# Patient Record
Sex: MALE | Race: BLACK OR AFRICAN AMERICAN | Employment: STUDENT | URBAN - METROPOLITAN AREA
[De-identification: names, ages, dates, MRNs, and addresses within clinical notes are randomized per-mention and may not be internally consistent; named-entity substitution may affect disease eponyms.]

---

## 2017-02-22 ENCOUNTER — OFFICE VISIT (OUTPATIENT)
Dept: FAMILY MEDICINE CLINIC | Age: 22
End: 2017-02-22

## 2017-02-22 VITALS
TEMPERATURE: 98.6 F | WEIGHT: 192 LBS | OXYGEN SATURATION: 99 % | DIASTOLIC BLOOD PRESSURE: 72 MMHG | SYSTOLIC BLOOD PRESSURE: 114 MMHG | HEART RATE: 78 BPM | RESPIRATION RATE: 16 BRPM | HEIGHT: 68 IN | BODY MASS INDEX: 29.1 KG/M2

## 2017-02-22 DIAGNOSIS — Z00.00 ROUTINE MEDICAL EXAM: Primary | ICD-10-CM

## 2017-02-22 DIAGNOSIS — R76.11 PPD POSITIVE, TREATED: ICD-10-CM

## 2017-02-22 RX ORDER — DESONIDE 0.5 MG/ML
LOTION TOPICAL 2 TIMES DAILY
Qty: 59 ML | Refills: 0 | Status: SHIPPED | OUTPATIENT
Start: 2017-02-22 | End: 2017-03-08

## 2017-02-22 NOTE — MR AVS SNAPSHOT
Visit Information Date & Time Provider Department Dept. Phone Encounter #  
 2/22/2017  3:00 PM Braydon Guo, [facility] [phone] [id number] Follow-up Instructions Return in about 1 year (around 2/22/2018), or as needed. Upcoming Health Maintenance Date Due Hepatitis A Peds Age 1-18 (1 of 2 - Standard Series) 8/18/1998 HPV AGE 9Y-26Y (1 of 3 - Male 3 Dose Series) 8/18/2008 INFLUENZA AGE 9 TO ADULT 8/1/2016 DTaP/Tdap/Td series (7 - Td) 8/4/2018 Allergies as of 2/22/2017  Review Complete On: 2/22/2017 By: Braydon Guo MD  
 No Known Allergies Current Immunizations  Reviewed on 2/22/2017 Name Date DTAP Vaccine 3/5/2002, 11/19/1998, 2/19/1998 DTAP/HIB Combined Vaccine 1997, 1997 HIB Vaccine 11/19/1998, 2/19/1998 Hepatitis B Vaccine 2/19/1998, 1997, 1997 IPV 3/5/2002, 1997, 1997 MMR Vaccine 3/5/2002, 11/19/1998 Meningococcal (MCV4P) Vaccine 7/29/2015 OPV 11/19/1998 PPD 2/29/2012 TB Skin Test (PPD) Intradermal 6/20/2016, 3/12/2013  3:48 PM  
 TDAP Vaccine 8/4/2008 Varicella Virus Vaccine Live 8/4/2008, 11/19/1998 Reviewed by Yonis Olmos on 2/22/2017 at  3:15 PM  
You Were Diagnosed With   
  
 Codes Comments Routine medical exam    -  Primary ICD-10-CM: Z00.00 ICD-9-CM: V70.0   
 PPD positive, treated     ICD-10-CM: R76.11 
ICD-9-CM: 795.51 Vitals BP  
  
  
  
  
  
 114/72 (27 %/ 36 %)* (BP 1 Location: Right arm, BP Patient Position: Sitting) *BP percentiles are based on NHBPEP's 4th Report Growth percentiles are based on CDC 2-20 Years data. Vitals History BMI and BSA Data Body Mass Index Body Surface Area  
 29.19 kg/m 2 2.04 m 2 Preferred Pharmacy Pharmacy Name Phone [facility] [address], [address] [phone] Your Updated Medication List  
  
   
 This list is accurate as of: 2/22/17  3:52 PM.  Always use your most recent med list.  
  
  
  
  
 adapalene 0.1 % topical cream  
Commonly known as:  DIFFERIN Apply  to affected area nightly. use small amount as directed  
  
 clindamycin-benzoyl peroxide 1-5 % topical gel Commonly known as:  Misael Toombs Apply  to affected area every morning. Apply to affected area after the skin has been cleansed and dried. desonide 0.05 % topical lotion Commonly known as:  Debby Rathke Apply  to affected area two (2) times a day for 14 days. Prescriptions Printed Refills  
 desonide (TRIDESILON) 0.05 % topical lotion 0 Sig: Apply  to affected area two (2) times a day for 14 days. Class: Print Route: Topical  
  
Follow-up Instructions Return in about 1 year (around 2/22/2018), or as needed. To-Do List   
 02/22/2017 Imaging:  XR CHEST PA LAT Patient Instructions Well Visit, Ages 25 to 48: Care Instructions Your Care Instructions Physical exams can help you stay healthy. Your doctor has checked your overall health and may have suggested ways to take good care of yourself. He or she also may have recommended tests. At home, you can help prevent illness with healthy eating, regular exercise, and other steps. Follow-up care is a key part of your treatment and safety. Be sure to make and go to all appointments, and call your doctor if you are having problems. It's also a good idea to know your test results and keep a list of the medicines you take. How can you care for yourself at home? · Reach and stay at a healthy weight. This will lower your risk for many problems, such as obesity, diabetes, heart disease, and high blood pressure. · Get at least 30 minutes of physical activity on most days of the week. Walking is a good choice. You also may want to do other activities, such as running, swimming, cycling, or playing tennis or team sports.  Discuss any changes in your exercise program with your doctor. · Do not smoke or allow others to smoke around you. If you need help quitting, talk to your doctor about stop-smoking programs and medicines. These can increase your chances of quitting for good. · Talk to your doctor about whether you have any risk factors for sexually transmitted infections (STIs). Having one sex partner (who does not have STIs and does not have sex with anyone else) is a good way to avoid these infections. · Use birth control if you do not want to have children at this time. Talk with your doctor about the choices available and what might be best for you. · Protect your skin from too much sun. When you're outdoors from 10 a.m. to 4 p.m., stay in the shade or cover up with clothing and a hat with a wide brim. Wear sunglasses that block UV rays. Even when it's cloudy, put broad-spectrum sunscreen (SPF 30 or higher) on any exposed skin. · See a dentist one or two times a year for checkups and to have your teeth cleaned. · Wear a seat belt in the car. · Drink alcohol in moderation, if at all. That means no more than 2 drinks a day for men and 1 drink a day for women. Follow your doctor's advice about when to have certain tests. These tests can spot problems early. For everyone · Cholesterol. Have the fat (cholesterol) in your blood tested after age 21. Your doctor will tell you how often to have this done based on your age, family history, or other things that can increase your risk for heart disease. · Blood pressure. Have your blood pressure checked during a routine doctor visit. Your doctor will tell you how often to check your blood pressure based on your age, your blood pressure results, and other factors. · Vision. Talk with your doctor about how often to have a glaucoma test. 
· Diabetes. Ask your doctor whether you should have tests for diabetes. · Colon cancer. Have a test for colon cancer at age 48.  You may have one of several tests. If you are younger than 48, you may need a test earlier if you have any risk factors. Risk factors include whether you already had a precancerous polyp removed from your colon or whether your parent, brother, sister, or child has had colon cancer. For women · Breast exam and mammogram. Talk to your doctor about when you should have a clinical breast exam and a mammogram. Medical experts differ on whether and how often women under 50 should have these tests. Your doctor can help you decide what is right for you. · Pap test and pelvic exam. Begin Pap tests at age 24. A Pap test is the best way to find cervical cancer. The test often is part of a pelvic exam. Ask how often to have this test. 
· Tests for sexually transmitted infections (STIs). Ask whether you should have tests for STIs. You may be at risk if you have sex with more than one person, especially if your partners do not wear condoms. For men · Tests for sexually transmitted infections (STIs). Ask whether you should have tests for STIs. You may be at risk if you have sex with more than one person, especially if you do not wear a condom. · Testicular cancer exam. Ask your doctor whether you should check your testicles regularly. · Prostate exam. Talk to your doctor about whether you should have a blood test (called a PSA test) for prostate cancer. Experts differ on whether and when men should have this test. Some experts suggest it if you are older than 39 and are -American or have a father or brother who got prostate cancer when he was younger than 72. When should you call for help? Watch closely for changes in your health, and be sure to contact your doctor if you have any problems or symptoms that concern you. Where can you learn more? Go to http://glenis-brianna.info/. Enter P072 in the search box to learn more about \"Well Visit, Ages 25 to 48: Care Instructions. \" Current as of: July 19, 2016 Content Version: 11.1 © 9333-8307 [employer]. Care instructions adapted under license by [employer] (which disclaims liability or warranty for this information). If you have questions about a medical condition or this instruction, always ask your healthcare professional. [address] any warranty or liability for your use of this information. Testicular Self-Exam: Care Instructions Your Care Instructions A self-exam is a way for you to check for cancer of the testicles. Although testicular cancer is rare, it is one of the most common tumors in men younger than 28. Many testicular cancers are found during self-exam. In the early stages of testicular cancer, the lump, which may be about the size of a pea, usually is not painful. Testicular cancer, especially if treated early, is very often cured. By doing this self-exam regularly, you can learn the normal size, shape, and weight of your testicles. This allows you to note any changes. Follow-up care is a key part of your treatment and safety. Be sure to make and go to all appointments, and call your doctor if you are having problems. Its also a good idea to know your test results and keep a list of the medicines you take. How can you care for yourself at home? · The exam is best done during or after a bath or showerwhen the scrotum, the skin sac that holds the testicles, is relaxed. · Stand and place your right leg on a raised surface about chair height. Then gently feel your scrotum until you find the right testicle. · Roll the testicle gently but firmly between your thumb and fingers of both hands. Carefully feel the surface for lumps. Feel for any change in the size, shape, or texture of the testicle. The testicle should feel round and smooth. It is normal for one testicle to be slightly larger than the other one. · Repeat this for the left side. Feel the entire surface of both testicles. · You may feel the epididymis, the soft tube behind each testicle. Become familiar with this structure so that you won't mistake it for a lump. When should you call for help? Call your doctor now or seek immediate medical care if: 
· You find a lump. · You notice that one of your testicles is swollen or larger than it was the last time you checked. · You notice that a testicle has become harder or firmer. · You have pain in the testicles or scrotum. · You have a feeling of heaviness in a testicle or the scrotum. · You notice a change in your testicles. Watch closely for changes in your health, and be sure to contact your doctor if: 
· You need more information about testicular self-exam. 
Where can you learn more? Go to http://glenis-brianna.info/. Enter D107 in the search box to learn more about \"Testicular Self-Exam: Care Instructions. \" Current as of: May 24, 2016 Content Version: 11.1 © 2310-4686 [employer]. Care instructions adapted under license by [employer] (which disclaims liability or warranty for this information). If you have questions about a medical condition or this instruction, always ask your healthcare professional. [place] [address] any warranty or liability for your use of this information. Introducing hospitals & HEALTH SERVICES! [facility] introduces [employer] patient portal. Now you can access parts of your medical record, email your doctor's office, and request medication refills online. 1. In your internet browser, go to [email or web address] 2. Click on the First Time User? Click Here link in the Sign In box. You will see the New Member Sign Up page. 3. Enter your [employer] Access Code exactly as it appears below. You will not need to use this code after youve completed the sign-up process. If you do not sign up before the expiration date, you must request a new code. · Sharalike Access Code: [email or web address] Expires: 5/23/2017  3:31 PM 
 
4. Enter the last four digits of your Social Security Number (xxxx) and Date of Birth (mm/dd/yyyy) as indicated and click Submit. You will be taken to the next sign-up page. 5. Create a Sharalike ID. This will be your Sharalike login ID and cannot be changed, so think of one that is secure and easy to remember. 6. Create a Shar[employer]ike password. You can change your password at any time. 7. Enter your Password Reset Question and Answer. This can be used at a later time if you forget your password. 8. Enter your e-mail address. You will receive e-mail notification when new information is available in [address] 9. Click Sign Up. You can now view and download portions of your medical record. 10. Click the Download Summary menu link to download a portable copy of your medical information. If you have questions, please visit the Frequently Asked Questions section of the Shar[employer] website. Remember, Shar[employer]ike is NOT to be used for urgent needs. For medical emergencies, dial 911. Now available from your iPhone and Android! Please provide this summary of care documentation to your next provider. Your primary care clinician is listed as June Zuniga. If you have any questions after today's visit, please call [phone].

## 2017-02-22 NOTE — PROGRESS NOTES
1. Have you been to the ER, urgent care clinic since your last visit? Hospitalized since your last visit? No    2. Have you seen or consulted any other health care providers outside of the [address] [place] [address] since your last visit? Include any pap smears or colon screening. Yes When: 02- Where: Dr. Jessica Aaron.  [facility] (Derm) Reason for visit: rash to facial area

## 2017-02-22 NOTE — PATIENT INSTRUCTIONS
Well Visit, Ages 25 to 48: Care Instructions  Your Care Instructions  Physical exams can help you stay healthy. Your doctor has checked your overall health and may have suggested ways to take good care of yourself. He or she also may have recommended tests. At home, you can help prevent illness with healthy eating, regular exercise, and other steps. Follow-up care is a key part of your treatment and safety. Be sure to make and go to all appointments, and call your doctor if you are having problems. It's also a good idea to know your test results and keep a list of the medicines you take. How can you care for yourself at home? · Reach and stay at a healthy weight. This will lower your risk for many problems, such as obesity, diabetes, heart disease, and high blood pressure. · Get at least 30 minutes of physical activity on most days of the week. Walking is a good choice. You also may want to do other activities, such as running, swimming, cycling, or playing tennis or team sports. Discuss any changes in your exercise program with your doctor. · Do not smoke or allow others to smoke around you. If you need help quitting, talk to your doctor about stop-smoking programs and medicines. These can increase your chances of quitting for good. · Talk to your doctor about whether you have any risk factors for sexually transmitted infections (STIs). Having one sex partner (who does not have STIs and does not have sex with anyone else) is a good way to avoid these infections. · Use birth control if you do not want to have children at this time. Talk with your doctor about the choices available and what might be best for you. · Protect your skin from too much sun. When you're outdoors from 10 a.m. to 4 p.m., stay in the shade or cover up with clothing and a hat with a wide brim. Wear sunglasses that block UV rays. Even when it's cloudy, put broad-spectrum sunscreen (SPF 30 or higher) on any exposed skin.   · See a dentist one or two times a year for checkups and to have your teeth cleaned. · Wear a seat belt in the car. · Drink alcohol in moderation, if at all. That means no more than 2 drinks a day for men and 1 drink a day for women. Follow your doctor's advice about when to have certain tests. These tests can spot problems early. For everyone  · Cholesterol. Have the fat (cholesterol) in your blood tested after age 21. Your doctor will tell you how often to have this done based on your age, family history, or other things that can increase your risk for heart disease. · Blood pressure. Have your blood pressure checked during a routine doctor visit. Your doctor will tell you how often to check your blood pressure based on your age, your blood pressure results, and other factors. · Vision. Talk with your doctor about how often to have a glaucoma test.  · Diabetes. Ask your doctor whether you should have tests for diabetes. · Colon cancer. Have a test for colon cancer at age 48. You may have one of several tests. If you are younger than 48, you may need a test earlier if you have any risk factors. Risk factors include whether you already had a precancerous polyp removed from your colon or whether your parent, brother, sister, or child has had colon cancer. For women  · Breast exam and mammogram. Talk to your doctor about when you should have a clinical breast exam and a mammogram. Medical experts differ on whether and how often women under 50 should have these tests. Your doctor can help you decide what is right for you. · Pap test and pelvic exam. Begin Pap tests at age 24. A Pap test is the best way to find cervical cancer. The test often is part of a pelvic exam. Ask how often to have this test.  · Tests for sexually transmitted infections (STIs). Ask whether you should have tests for STIs. You may be at risk if you have sex with more than one person, especially if your partners do not wear condoms.   For men  · Tests for sexually transmitted infections (STIs). Ask whether you should have tests for STIs. You may be at risk if you have sex with more than one person, especially if you do not wear a condom. · Testicular cancer exam. Ask your doctor whether you should check your testicles regularly. · Prostate exam. Talk to your doctor about whether you should have a blood test (called a PSA test) for prostate cancer. Experts differ on whether and when men should have this test. Some experts suggest it if you are older than 39 and are -American or have a father or brother who got prostate cancer when he was younger than 72. When should you call for help? Watch closely for changes in your health, and be sure to contact your doctor if you have any problems or symptoms that concern you. Where can you learn more? Go to http://glenis-brianna.info/. Enter P072 in the search box to learn more about \"Well Visit, Ages 25 to 48: Care Instructions. \"  Current as of: July 19, 2016  Content Version: 11.1  © 7903-6710 [employer]. Care instructions adapted under license by [employer] (which disclaims liability or warranty for this information). If you have questions about a medical condition or this instruction, always ask your healthcare professional. [place] [address] any warranty or liability for your use of this information. Testicular Self-Exam: Care Instructions  Your Care Instructions    A self-exam is a way for you to check for cancer of the testicles. Although testicular cancer is rare, it is one of the most common tumors in men younger than 28. Many testicular cancers are found during self-exam. In the early stages of testicular cancer, the lump, which may be about the size of a pea, usually is not painful. Testicular cancer, especially if treated early, is very often cured.   By doing this self-exam regularly, you can learn the normal size, shape, and weight of your testicles. This allows you to note any changes. Follow-up care is a key part of your treatment and safety. Be sure to make and go to all appointments, and call your doctor if you are having problems. Its also a good idea to know your test results and keep a list of the medicines you take. How can you care for yourself at home? · The exam is best done during or after a bath or showerwhen the scrotum, the skin sac that holds the testicles, is relaxed. · Stand and place your right leg on a raised surface about chair height. Then gently feel your scrotum until you find the right testicle. · Roll the testicle gently but firmly between your thumb and fingers of both hands. Carefully feel the surface for lumps. Feel for any change in the size, shape, or texture of the testicle. The testicle should feel round and smooth. It is normal for one testicle to be slightly larger than the other one. · Repeat this for the left side. Feel the entire surface of both testicles. · You may feel the epididymis, the soft tube behind each testicle. Become familiar with this structure so that you won't mistake it for a lump. When should you call for help? Call your doctor now or seek immediate medical care if:  · You find a lump. · You notice that one of your testicles is swollen or larger than it was the last time you checked. · You notice that a testicle has become harder or firmer. · You have pain in the testicles or scrotum. · You have a feeling of heaviness in a testicle or the scrotum. · You notice a change in your testicles. Watch closely for changes in your health, and be sure to contact your doctor if:  · You need more information about testicular self-exam.  Where can you learn more? Go to http://glenis-brianna.info/. Enter J166 in the search box to learn more about \"Testicular Self-Exam: Care Instructions. \"  Current as of: May 24, 2016  Content Version: 11.1  © 9543-4352 [employer]. Care instructions adapted under license by [employer] (which disclaims liability or warranty for this information). If you have questions about a medical condition or this instruction, always ask your healthcare professional. [address] any warranty or liability for your use of this information.

## 2017-02-22 NOTE — PROGRESS NOTES
Subjective:   Leander Nance is a 25 y.o. male presenting for his annual checkup. ROS:  Feeling well. No dyspnea or chest pain on exertion. No abdominal pain, change in bowel habits, black or bloody stools. No urinary tract or prostatic symptoms. No neurological complaints. Specific concerns today:   Patient presents to clinic for physical exam. The only current health concern that he has is a skin rash around his mouth. He first noticed the rash on 12/24/16 after taking a shower. He noted that the skin around his mouth was dry, painful, and burning. He had previously tried a razorless shaving cream to remove facial hair, but it didnt work and he thinks that this could have caused the rash. He saw a dermatologist (Dr. Sierra Smith at [facility] in [place]), where he says that he was never told what the diagnosis was but was given a prescription. Current Outpatient Prescriptions   Medication Sig Dispense Refill    desonide (TRIDESILON) 0.05 % topical lotion Apply  to affected area two (2) times a day for 14 days. 59 mL 0    clindamycin-benzoyl peroxide (BENZACLIN) 1-5 % topical gel Apply  to affected area every morning. Apply to affected area after the skin has been cleansed and dried. 1 Tube 0    adapalene (DIFFERIN) 0.1 % topical cream Apply  to affected area nightly. 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 No pertinent surgical history.   Family History   Problem Relation Age of Onset    Other Paternal Grandmother      COPD    Hypertension Paternal Grandfather     High Cholesterol Paternal Grandfather     Heart Attack Paternal Grandfather     Diabetes Father      Social History   Substance Use Topics    Smoking status: Never Smoker    Smokeless tobacco: Never Used    Alcohol use No      Objective:     Visit Vitals    /72 (BP 1 Location: Right arm, BP Patient Position: Sitting)    Pulse 78    Temp 98.6 °F (37 °C) (Oral)    Resp 16    Ht 5' 8\" (1.727 m)    Wt 192 lb (87.1 kg)    SpO2 99%    BMI 29.19 kg/m2     The patient appears well, alert, oriented x 3, in no distress. ENT normal.  Neck supple. No adenopathy or thyromegaly. GAGAN. Lungs are clear, good air entry, no wheezes, rhonchi or rales. S1 and S2 normal, no murmurs, regular rate and rhythm. Abdomen is soft without tenderness, guarding, mass or organomegaly.  exam: Counseled on self testicular exam.  Extremities show no edema, normal peripheral pulses. Neurological is normal without focal findings. Psych: normal affect. Mood good. Oriented x 3. Judgement and insight intact. Assessment/Plan:       ICD-10-CM ICD-9-CM    1. Routine medical exam Z00.00 V70.0    2. PPD positive, treated R76.11 795.51 XR CHEST PA LAT     Age and sex specific counseling. 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 He has had treatment so I provided a letter stating that. Desonide rx for dermatitis - if too expensive he will call his dermatologist for alternatives. All chart history elements were reviewed by me at the time of the visit even though marked at time of note closure. Patient understands our medical plan. Patient has provided input and agrees with goals. Alternatives have been explained and offered. All questions answered. The patient is to call if condition worsens or fails to improve. Follow-up Disposition:  Return in about 1 year (around 2/22/2018), or as needed.

## 2017-06-12 ENCOUNTER — TELEPHONE (OUTPATIENT)
Dept: FAMILY MEDICINE CLINIC | Age: 22
End: 2017-06-12

## 2017-06-12 NOTE — TELEPHONE ENCOUNTER
Patient misplaced PPD paperwork for school that Anastasia Collazo had completed from last year. Requesting call back from nurse.

## 2017-09-14 ENCOUNTER — OFFICE VISIT (OUTPATIENT)
Dept: FAMILY MEDICINE CLINIC | Age: 22
End: 2017-09-14

## 2017-09-14 VITALS
OXYGEN SATURATION: 98 % | WEIGHT: 191 LBS | SYSTOLIC BLOOD PRESSURE: 120 MMHG | BODY MASS INDEX: 28.95 KG/M2 | RESPIRATION RATE: 16 BRPM | DIASTOLIC BLOOD PRESSURE: 70 MMHG | TEMPERATURE: 98.9 F | HEIGHT: 68 IN | HEART RATE: 72 BPM

## 2017-09-14 DIAGNOSIS — E66.3 OVERWEIGHT (BMI 25.0-29.9): ICD-10-CM

## 2017-09-14 DIAGNOSIS — Z00.00 ROUTINE MEDICAL EXAM: Primary | ICD-10-CM

## 2017-09-14 NOTE — PATIENT INSTRUCTIONS
Well Visit, Ages 25 to 48: Care Instructions  Your Care Instructions  Physical exams can help you stay healthy. Your doctor has checked your overall health and may have suggested ways to take good care of yourself. He or she also may have recommended tests. At home, you can help prevent illness with healthy eating, regular exercise, and other steps. Follow-up care is a key part of your treatment and safety. Be sure to make and go to all appointments, and call your doctor if you are having problems. It's also a good idea to know your test results and keep a list of the medicines you take. How can you care for yourself at home? · Reach and stay at a healthy weight. This will lower your risk for many problems, such as obesity, diabetes, heart disease, and high blood pressure. · Get at least 30 minutes of physical activity on most days of the week. Walking is a good choice. You also may want to do other activities, such as running, swimming, cycling, or playing tennis or team sports. Discuss any changes in your exercise program with your doctor. · Do not smoke or allow others to smoke around you. If you need help quitting, talk to your doctor about stop-smoking programs and medicines. These can increase your chances of quitting for good. · Talk to your doctor about whether you have any risk factors for sexually transmitted infections (STIs). Having one sex partner (who does not have STIs and does not have sex with anyone else) is a good way to avoid these infections. · Use birth control if you do not want to have children at this time. Talk with your doctor about the choices available and what might be best for you. · Protect your skin from too much sun. When you're outdoors from 10 a.m. to 4 p.m., stay in the shade or cover up with clothing and a hat with a wide brim. Wear sunglasses that block UV rays. Even when it's cloudy, put broad-spectrum sunscreen (SPF 30 or higher) on any exposed skin.   · See a dentist one or two times a year for checkups and to have your teeth cleaned. · Wear a seat belt in the car. · Drink alcohol in moderation, if at all. That means no more than 2 drinks a day for men and 1 drink a day for women. Follow your doctor's advice about when to have certain tests. These tests can spot problems early. For everyone  · Cholesterol. Have the fat (cholesterol) in your blood tested after age 21. Your doctor will tell you how often to have this done based on your age, family history, or other things that can increase your risk for heart disease. · Blood pressure. Have your blood pressure checked during a routine doctor visit. Your doctor will tell you how often to check your blood pressure based on your age, your blood pressure results, and other factors. · Vision. Talk with your doctor about how often to have a glaucoma test.  · Diabetes. Ask your doctor whether you should have tests for diabetes. · Colon cancer. Have a test for colon cancer at age 48. You may have one of several tests. If you are younger than 48, you may need a test earlier if you have any risk factors. Risk factors include whether you already had a precancerous polyp removed from your colon or whether your parent, brother, sister, or child has had colon cancer. For women  · Breast exam and mammogram. Talk to your doctor about when you should have a clinical breast exam and a mammogram. Medical experts differ on whether and how often women under 50 should have these tests. Your doctor can help you decide what is right for you. · Pap test and pelvic exam. Begin Pap tests at age 24. A Pap test is the best way to find cervical cancer. The test often is part of a pelvic exam. Ask how often to have this test.  · Tests for sexually transmitted infections (STIs). Ask whether you should have tests for STIs. You may be at risk if you have sex with more than one person, especially if your partners do not wear condoms.   For men  · Tests for sexually transmitted infections (STIs). Ask whether you should have tests for STIs. You may be at risk if you have sex with more than one person, especially if you do not wear a condom. · Testicular cancer exam. Ask your doctor whether you should check your testicles regularly. · Prostate exam. Talk to your doctor about whether you should have a blood test (called a PSA test) for prostate cancer. Experts differ on whether and when men should have this test. Some experts suggest it if you are older than 39 and are -American or have a father or brother who got prostate cancer when he was younger than 72. When should you call for help? Watch closely for changes in your health, and be sure to contact your doctor if you have any problems or symptoms that concern you. Where can you learn more? Go to http://glenis-brianna.info/. Enter P072 in the search box to learn more about \"Well Visit, Ages 25 to 48: Care Instructions. \"  Current as of: July 19, 2016  Content Version: 11.3  © 4670-5079 [employer]. Care instructions adapted under license by [employer] (which disclaims liability or warranty for this information). If you have questions about a medical condition or this instruction, always ask your healthcare professional. [place] [address] any warranty or liability for your use of this information. Testicular Self-Exam: Care Instructions  Your Care Instructions    A self-exam is a way for you to check for cancer of the testicles. Although testicular cancer is rare, it is one of the most common tumors in men younger than 28. Many testicular cancers are found during self-exam. In the early stages of testicular cancer, the lump, which may be about the size of a pea, usually is not painful. Testicular cancer, especially if treated early, is very often cured.   By doing this self-exam regularly, you can learn the normal size, shape, and weight of your testicles. This allows you to note any changes. Follow-up care is a key part of your treatment and safety. Be sure to make and go to all appointments, and call your doctor if you are having problems. Its also a good idea to know your test results and keep a list of the medicines you take. How can you care for yourself at home? · The exam is best done during or after a bath or showerwhen the scrotum, the skin sac that holds the testicles, is relaxed. · Stand and place your right leg on a raised surface about chair height. Then gently feel your scrotum until you find the right testicle. · Roll the testicle gently but firmly between your thumb and fingers of both hands. Carefully feel the surface for lumps. Feel for any change in the size, shape, or texture of the testicle. The testicle should feel round and smooth. It is normal for one testicle to be slightly larger than the other one. · Repeat this for the left side. Feel the entire surface of both testicles. · You may feel the epididymis, the soft tube behind each testicle. Become familiar with this structure so that you won't mistake it for a lump. When should you call for help? Call your doctor now or seek immediate medical care if:  · You find a lump. · You notice that one of your testicles is swollen or larger than it was the last time you checked. · You notice that a testicle has become harder or firmer. · You have pain in the testicles or scrotum. · You have a feeling of heaviness in a testicle or the scrotum. · You notice a change in your testicles. Watch closely for changes in your health, and be sure to contact your doctor if:  · You need more information about testicular self-exam.  Where can you learn more? Go to http://glenis-brianna.info/. Enter K434 in the search box to learn more about \"Testicular Self-Exam: Care Instructions. \"  Current as of: March 14, 2017  Content Version: 11.3  © 8187-0703 [employer]. Care instructions adapted under license by [employer] (which disclaims liability or warranty for this information). If you have questions about a medical condition or this instruction, always ask your healthcare professional. [address]  any warranty or liability for your use of this information.   Follow up in 1 year for annual physical

## 2017-09-14 NOTE — MR AVS SNAPSHOT
Visit Information Date & Time Provider Department Dept. Phone Encounter #  
 9/14/2017 10:30 AM Dana Conrad, [address] [place] [address] Road [id number] Follow-up Instructions Return in about 1 year (around 9/14/2018) for annual physical . Upcoming Health Maintenance Date Due Hepatitis A Peds Age 1-18 (2 of 3 - Twinrix 3-Dose Series) 10/12/2017 HPV AGE 9Y-26Y (2 of 3 - Male 3 Dose Series) 11/9/2017 DTaP/Tdap/Td series (7 - Td) 8/4/2018 Allergies as of 9/14/2017  Review Complete On: 9/14/2017 By: Dana Conrad MD  
 No Known Allergies Current Immunizations  Reviewed on 9/14/2017 Name Date DTAP Vaccine 3/5/2002, 11/19/1998, 2/19/1998 DTAP/HIB Combined Vaccine 1997, 1997 HIB Vaccine 11/19/1998, 2/19/1998 Hepatitis B Vaccine 2/19/1998, 1997, 1997 IPV 3/5/2002, 1997, 1997 MMR Vaccine 3/5/2002, 11/19/1998 Meningococcal (MCV4P) Vaccine 7/29/2015 OPV 11/19/1998 PPD 2/29/2012 TB Skin Test (PPD) Intradermal 6/20/2016, 3/12/2013  3:48 PM  
 TDAP Vaccine 8/4/2008 Varicella Virus Vaccine Live 8/4/2008, 11/19/1998 Reviewed by Dana Conrad MD on 9/14/2017 at 10:46 AM  
 Reviewed by Dana Conrad MD on 9/14/2017 at 10:47 AM  
You Were Diagnosed With   
  
 Codes Comments Routine medical exam    -  Primary ICD-10-CM: Z00.00 ICD-9-CM: V70.0 Overweight (BMI 25.0-29. 9)     ICD-10-CM: X31.0 ICD-9-CM: 278.02 Vitals BP Pulse Temp Resp Height(growth percentile) Weight(growth percentile) 120/70 (BP 1 Location: Left arm, BP Patient Position: Sitting) 72 98.9 °F (37.2 °C) (Oral) 16 5' 8\" (1.727 m) 191 lb (86.6 kg) SpO2 BMI Smoking Status 98% 29.04 kg/m2 Never Smoker Vitals History BMI and BSA Data Body Mass Index Body Surface Area 29.04 kg/m 2 2.04 m 2 Preferred Pharmacy Pharmacy Name Phone [facility], [address] [phone] Your Updated Medication List  
  
   
This list is accurate as of: 9/14/17 10:56 AM.  Always use your most recent med list.  
  
  
  
  
 adapalene 0.1 % topical cream  
Commonly known as:  DIFFERIN Apply  to affected area nightly. use small amount as directed  
  
 clindamycin-benzoyl peroxide 1-5 % topical gel Commonly known as:  Linard Chapel Apply  to affected area every morning. Apply to affected area after the skin has been cleansed and dried. Follow-up Instructions Return in about 1 year (around 9/14/2018) for annual physical . Patient Instructions Well Visit, Ages 25 to 48: Care Instructions Your Care Instructions Physical exams can help you stay healthy. Your doctor has checked your overall health and may have suggested ways to take good care of yourself. He or she also may have recommended tests. At home, you can help prevent illness with healthy eating, regular exercise, and other steps. Follow-up care is a key part of your treatment and safety. Be sure to make and go to all appointments, and call your doctor if you are having problems. It's also a good idea to know your test results and keep a list of the medicines you take. How can you care for yourself at home? · Reach and stay at a healthy weight. This will lower your risk for many problems, such as obesity, diabetes, heart disease, and high blood pressure. · Get at least 30 minutes of physical activity on most days of the week. Walking is a good choice. You also may want to do other activities, such as running, swimming, cycling, or playing tennis or team sports. Discuss any changes in your exercise program with your doctor. · Do not smoke or allow others to smoke around you. If you need help quitting, talk to your doctor about stop-smoking programs and medicines. These can increase your chances of quitting for good. · Talk to your doctor about whether you have any risk factors for sexually transmitted infections (STIs). Having one sex partner (who does not have STIs and does not have sex with anyone else) is a good way to avoid these infections. · Use birth control if you do not want to have children at this time. Talk with your doctor about the choices available and what might be best for you. · Protect your skin from too much sun. When you're outdoors from 10 a.m. to 4 p.m., stay in the shade or cover up with clothing and a hat with a wide brim. Wear sunglasses that block UV rays. Even when it's cloudy, put broad-spectrum sunscreen (SPF 30 or higher) on any exposed skin. · See a dentist one or two times a year for checkups and to have your teeth cleaned. · Wear a seat belt in the car. · Drink alcohol in moderation, if at all. That means no more than 2 drinks a day for men and 1 drink a day for women. Follow your doctor's advice about when to have certain tests. These tests can spot problems early. For everyone · Cholesterol. Have the fat (cholesterol) in your blood tested after age 21. Your doctor will tell you how often to have this done based on your age, family history, or other things that can increase your risk for heart disease. · Blood pressure. Have your blood pressure checked during a routine doctor visit. Your doctor will tell you how often to check your blood pressure based on your age, your blood pressure results, and other factors. · Vision. Talk with your doctor about how often to have a glaucoma test. 
· Diabetes. Ask your doctor whether you should have tests for diabetes. · Colon cancer. Have a test for colon cancer at age 48. You may have one of several tests. If you are younger than 48, you may need a test earlier if you have any risk factors.  Risk factors include whether you already had a precancerous polyp removed from your colon or whether your parent, brother, sister, or child has had colon cancer. For women · Breast exam and mammogram. Talk to your doctor about when you should have a clinical breast exam and a mammogram. Medical experts differ on whether and how often women under 50 should have these tests. Your doctor can help you decide what is right for you. · Pap test and pelvic exam. Begin Pap tests at age 24. A Pap test is the best way to find cervical cancer. The test often is part of a pelvic exam. Ask how often to have this test. 
· Tests for sexually transmitted infections (STIs). Ask whether you should have tests for STIs. You may be at risk if you have sex with more than one person, especially if your partners do not wear condoms. For men · Tests for sexually transmitted infections (STIs). Ask whether you should have tests for STIs. You may be at risk if you have sex with more than one person, especially if you do not wear a condom. · Testicular cancer exam. Ask your doctor whether you should check your testicles regularly. · Prostate exam. Talk to your doctor about whether you should have a blood test (called a PSA test) for prostate cancer. Experts differ on whether and when men should have this test. Some experts suggest it if you are older than 39 and are -American or have a father or brother who got prostate cancer when he was younger than 72. When should you call for help? Watch closely for changes in your health, and be sure to contact your doctor if you have any problems or symptoms that concern you. Where can you learn more? Go to http://glenis-brianna.info/. Enter P072 in the search box to learn more about \"Well Visit, Ages 25 to 48: Care Instructions. \" Current as of: July 19, 2016 Content Version: 11.3 © 3583-9100 [employer].  Care instructions adapted under license by [employer] (which disclaims liability or warranty for this information). If you have questions about a medical condition or this instruction, always ask your healthcare professional. [address] any warranty or liability for your use of this information. Testicular Self-Exam: Care Instructions Your Care Instructions A self-exam is a way for you to check for cancer of the testicles. Although testicular cancer is rare, it is one of the most common tumors in men younger than 28. Many testicular cancers are found during self-exam. In the early stages of testicular cancer, the lump, which may be about the size of a pea, usually is not painful. Testicular cancer, especially if treated early, is very often cured. By doing this self-exam regularly, you can learn the normal size, shape, and weight of your testicles. This allows you to note any changes. Follow-up care is a key part of your treatment and safety. Be sure to make and go to all appointments, and call your doctor if you are having problems. Its also a good idea to know your test results and keep a list of the medicines you take. How can you care for yourself at home? · The exam is best done during or after a bath or showerwhen the scrotum, the skin sac that holds the testicles, is relaxed. · Stand and place your right leg on a raised surface about chair height. Then gently feel your scrotum until you find the right testicle. · Roll the testicle gently but firmly between your thumb and fingers of both hands. Carefully feel the surface for lumps. Feel for any change in the size, shape, or texture of the testicle. The testicle should feel round and smooth. It is normal for one testicle to be slightly larger than the other one. · Repeat this for the left side. Feel the entire surface of both testicles. · You may feel the epididymis, the soft tube behind each testicle. Become familiar with this structure so that you won't mistake it for a lump. When should you call for help? Call your doctor now or seek immediate medical care if: 
· You find a lump. · You notice that one of your testicles is swollen or larger than it was the last time you checked. · You notice that a testicle has become harder or firmer. · You have pain in the testicles or scrotum. · You have a feeling of heaviness in a testicle or the scrotum. · You notice a change in your testicles. Watch closely for changes in your health, and be sure to contact your doctor if: 
· You need more information about testicular self-exam. 
Where can you learn more? Go to http://glenis-brianna.info/. Enter C510 in the search box to learn more about \"Testicular Self-Exam: Care Instructions. \" Current as of: March 14, 2017 Content Version: 11.3 © 1740-2543 From[employer]. Care instructions adapted under license by [employer] (which disclaims liability or warranty for this information). If you have questions about a medical condition or this instruction, always ask your healthcare professional. [address] any warranty or liability for your use of this information. Follow up in 1 year for annual physical  
 
 
  
Introducing [facility]! [facility] introduces [employer] patient portal. Now you can access parts of your medical record, email your doctor's office, and request medication refills online. 1. In your internet browser, go to [email or web address] 2. Click on the First Time User? Click Here link in the Sign In box. You will see the New Member Sign Up page. 3. Enter your [employer] Access Code exactly as it appears below. You will not need to use this code after youve completed the sign-up process. If you do not sign up before the expiration date, you must request a new code. · [employer] Access Code: [email or web address] Expires: 12/13/2017 10:45 AM 
 
 4. Enter the last four digits of your Social Security Number (xxxx) and Date of Birth (mm/dd/yyyy) as indicated and click Submit. You will be taken to the next sign-up page. 5. Create a Movinto Fun ID. This will be your Mov[employer] login ID and cannot be changed, so think of one that is secure and easy to remember. 6. Create a [employer] password. You can change your password at any time. 7. Enter your Password Reset Question and Answer. This can be used at a later time if you forget your password. 8. Enter your e-mail address. You will receive e-mail notification when new information is available in [address] 9. Click Sign Up. You can now view and download portions of your medical record. 10. Click the Download Summary menu link to download a portable copy of your medical information. If you have questions, please visit the Frequently Asked Questions section of the Mov[employer] website. Remember, Mov[employer] is NOT to be used for urgent needs. For medical emergencies, dial 911. Now available from your iPhone and Android! Please provide this summary of care documentation to your next provider. Your primary care clinician is listed as Carmen Luna. If you have any questions after today's visit, please call [phone].

## 2017-09-14 NOTE — PROGRESS NOTES
Subjective:     Pooja Mcallister is a 23 y.o. male presenting for annual exam and complete physical.    He has no medical complaints. Current Outpatient Prescriptions   Medication Sig Dispense Refill    clindamycin-benzoyl peroxide (BENZACLIN) 1-5 % topical gel Apply  to affected area every morning. Apply to affected area after the skin has been cleansed and dried. 1 Tube 0    adapalene (DIFFERIN) 0.1 % topical cream Apply  to affected area nightly. use small amount as directed 45 g 0     No Known Allergies  Past Medical History:   Diagnosis Date    Latent tuberculosis 3/2013    treated x 7 months - adequate     Past Surgical History:   Procedure Laterality Date    HX WISDOM TEETH EXTRACTION  09/12/2017     Family History   Problem Relation Age of Onset    Other Paternal Grandmother      COPD    Hypertension Paternal Grandfather     High Cholesterol Paternal Grandfather     Heart Attack Paternal Grandfather     Diabetes Father      Social History   Substance Use Topics    Smoking status: Never Smoker    Smokeless tobacco: Never Used    Alcohol use No      Review of Systems  A comprehensive review of systems was negative except for that written in the HPI.     Objective:     Visit Vitals    /70 (BP 1 Location: Left arm, BP Patient Position: Sitting)    Pulse 72    Temp 98.9 °F (37.2 °C) (Oral)    Resp 16    Ht 5' 8\" (1.727 m)    Wt 191 lb (86.6 kg)    SpO2 98%    BMI 29.04 kg/m2     Physical exam:   General appearance - alert, well appearing, and in no distress  Mental status - oriented to person, place, and time, normal mood, behavior, speech, dress, motor activity, and thought processes  Eyes - pupils equal and reactive, extraocular eye movements intact  Ears - bilateral TM's and external ear canals normal  Nose - normal and patent, no erythema, discharge or polyps  Mouth - mucous membranes moist, pharynx normal without lesions  Neck - supple, no significant adenopathy  Lymphatics - no palpable lymphadenopathy  Chest - clear to auscultation, no wheezes, rales or rhonchi, symmetric air entry  Heart - normal rate, regular rhythm, normal S1, S2, no murmurs, rubs, clicks or gallops  Abdomen - soft, nontender, nondistended, no masses or organomegaly   Male - counseled on self testicular exam  Neurological - cranial nerves II through XII intact, motor and sensory grossly normal bilaterally  Extremities - no pedal edema noted  Skin - normal coloration and turgor, no rashes, no suspicious skin lesions noted   Psych: normal affect. Mood good. Oriented x 3. Judgement and insight intact. Assessment/Plan:       ICD-10-CM ICD-9-CM    1. Routine medical exam Z00.00 V70.0    2. Overweight (BMI 25.0-29. 9) E66.3 278.02      Age and sex specific counseling. Pt ed handout. Advised on diet and exercise to prevent diabetes which is a family risk factor. Declines vaccines. All chart history elements were reviewed by me at the time of the visit even though marked at time of note closure. Patient understands our medical plan. Patient has provided input and agrees with goals. Alternatives have been explained and offered. All questions answered. The patient is to call if condition worsens or fails to improve.      Follow-up Disposition:  Return in about 1 year (around 9/14/2018) for annual physical .

## 2018-02-13 ENCOUNTER — OFFICE VISIT (OUTPATIENT)
Dept: FAMILY MEDICINE CLINIC | Age: 23
End: 2018-02-13

## 2018-02-13 VITALS
HEIGHT: 68 IN | RESPIRATION RATE: 14 BRPM | HEART RATE: 55 BPM | WEIGHT: 194 LBS | BODY MASS INDEX: 29.4 KG/M2 | OXYGEN SATURATION: 97 % | TEMPERATURE: 98.2 F | SYSTOLIC BLOOD PRESSURE: 110 MMHG | DIASTOLIC BLOOD PRESSURE: 72 MMHG

## 2018-02-13 DIAGNOSIS — R63.1 POLYDIPSIA: Primary | ICD-10-CM

## 2018-02-13 LAB — HBA1C MFR BLD HPLC: 4.5 %

## 2018-02-13 NOTE — PROGRESS NOTES
SUBJECTIVE  Chief Complaint   Patient presents with    Other     polydipsia     Shaking     after not eating for long periods of time     Patient concerned about diabetes. His father is diabetic. Patient states that if he is not eating for about 5 hours, he will get a bit shaky. He says that these episode do not occur otherwise. He has not been on any supplements. He feels like he drinks fluids excessively. He has had normal A1c testing in the past.   He has been working out and staying in shape. No palpitations or lightheadedness with working out. No syncope or presyncope. OBJECTIVE    Blood pressure 110/72, pulse (!) 55, temperature 98.2 °F (36.8 °C), temperature source Oral, resp. rate 14, height 5' 8\" (1.727 m), weight 194 lb (88 kg), SpO2 97 %. General:  Alert, cooperative, well appearing, in no apparent distress. Eyes:   The lids are without swelling, lesions, or drainage. The conjunctiva are clear and noninjected. ENT:  The tongue and mucous membranes are pink and moist without lesions. Neck:  The thyroid is normal size without nodules or masses. CV:  The heart sounds are bradycardic slight and normal in rhythm. There is a normal S1 and S2. There or no murmurs. Lungs: Inspiratory and expiratory efforts are full and unlabored. Lung sounds are clear and equal to auscultation throughout all lung fields without wheezing, rales, or rhonchi. Results for orders placed or performed in visit on 02/13/18   AMB POC HEMOGLOBIN A1C   Result Value Ref Range    Hemoglobin A1c (POC) 4.5 %     ASSESSMENT / PLAN    ICD-10-CM ICD-9-CM    1. Polydipsia R63.1 783.5 AMB POC HEMOGLOBIN A1C   2. Body mass index (BMI) of 25.0 to 29.9 E66.3 278.02      A1c is normal.   I have advised not skipping meals. Advised on snacks between meals. 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 BMI>25 - some of this is due to muscle mass.   He is encouraged to stay active. All chart history elements were reviewed by me at the time of the visit even though marked at time of note closure. Patient understands our medical plan. Patient has provided input and agrees with goals. Alternatives have been explained and offered. All questions answered. The patient is to call if condition worsens or fails to improve.      Follow-up Disposition:  Return mid September 2018 for physical.

## 2018-02-13 NOTE — PATIENT INSTRUCTIONS

## 2018-02-13 NOTE — MR AVS SNAPSHOT
[address] [place] [address] 
[phone] Patient: Kunal Hardy MRN: [id number] [facility] Visit Information Date & Time Provider Department Dept. Phone Encounter #  
 2/13/2018  8:45 AM Kelsie Dakins, [address] [place] [address] Road [id number] Follow-up Instructions Return mid September 2018 for physical.  
  
Upcoming Health Maintenance Date Due Hepatitis A Peds Age 1-18 (2 of 3 - Twinrix 3-Dose Series) 10/12/2017 HPV AGE 9Y-26Y (2 of 3 - Male 3 Dose Series) 11/9/2017 DTaP/Tdap/Td series (7 - Td) 8/4/2018 Allergies as of 2/13/2018  Review Complete On: 2/13/2018 By: Kelsie Dakins, MD  
 No Known Allergies Current Immunizations  Reviewed on 9/14/2017 Name Date DTAP Vaccine 3/5/2002, 11/19/1998, 2/19/1998 DTAP/HIB Combined Vaccine 1997, 1997 HIB Vaccine 11/19/1998, 2/19/1998 Hepatitis B Vaccine 2/19/1998, 1997, 1997 IPV 3/5/2002, 1997, 1997 MMR Vaccine 3/5/2002, 11/19/1998 Meningococcal (MCV4P) Vaccine 7/29/2015 OPV 11/19/1998 PPD 2/29/2012 TB Skin Test (PPD) Intradermal 6/20/2016, 3/12/2013  3:48 PM  
 TDAP Vaccine 8/4/2008 Varicella Virus Vaccine Live 8/4/2008, 11/19/1998 Not reviewed this visit You Were Diagnosed With   
  
 Codes Comments Polydipsia    -  Primary ICD-10-CM: R63.1 ICD-9-CM: 783.5 Vitals BP Pulse Temp Resp Height(growth percentile) Weight(growth percentile) 110/72 (BP 1 Location: Left arm, BP Patient Position: Sitting) (!) 55 98.2 °F (36.8 °C) (Oral) 14 5' 8\" (1.727 m) 194 lb (88 kg) SpO2 BMI Smoking Status 97% 29.5 kg/m2 Never Smoker Vitals History BMI and BSA Data Body Mass Index Body Surface Area  
 29.5 kg/m 2 2.05 m 2 Preferred Pharmacy Pharmacy Name Phone [facility] [address], [address] [phone] Your Updated Medication List  
  
   
This list is accurate as of: 2/13/18  9:20 AM.  Always use your most recent med list.  
  
  
  
  
 adapalene 0.1 % topical cream  
Commonly known as:  DIFFERIN Apply  to affected area nightly. use small amount as directed  
  
 clindamycin-benzoyl peroxide 1-5 % topical gel Commonly known as:  Margene Felty Apply  to affected area every morning. Apply to affected area after the skin has been cleansed and dried. We Performed the Following AMB POC HEMOGLOBIN A1C [15746 CPT(R)] Follow-up Instructions Return mid September 2018 for physical.  
  
  
Patient Instructions A Healthy Lifestyle: Care Instructions Your Care Instructions A healthy lifestyle can help you feel good, stay at a healthy weight, and have plenty of energy for both work and play. A healthy lifestyle is something you can share with your whole family. A healthy lifestyle also can lower your risk for serious health problems, such as high blood pressure, heart disease, and diabetes. You can follow a few steps listed below to improve your health and the health of your family. Follow-up care is a key part of your treatment and safety. Be sure to make and go to all appointments, and call your doctor if you are having problems. It's also a good idea to know your test results and keep a list of the medicines you take. How can you care for yourself at home? · Do not eat too much sugar, fat, or fast foods. You can still have dessert and treats now and then. The goal is moderation. · Start small to improve your eating habits. Pay attention to portion sizes, drink less juice and soda pop, and eat more fruits and vegetables. ¨ Eat a healthy amount of food. A 3-ounce serving of meat, for example, is about the size of a deck of cards. Fill the rest of your plate with vegetables and whole grains. ¨ Limit the amount of soda and sports drinks you have every day. Drink more water when you are thirsty. ¨ Eat at least 5 servings of fruits and vegetables every day. It may seem like a lot, but it is not hard to reach this goal. A serving or helping is 1 piece of fruit, 1 cup of vegetables, or 2 cups of leafy, raw vegetables. Have an apple or some carrot sticks as an afternoon snack instead of a candy bar. Try to have fruits and/or vegetables at every meal. 
· Make exercise part of your daily routine. You may want to start with simple activities, such as walking, bicycling, or slow swimming. Try to be active 30 to 60 minutes every day. You do not need to do all 30 to 60 minutes all at once. For example, you can exercise 3 times a day for 10 or 20 minutes. Moderate exercise is safe for most people, but it is always a good idea to talk to your doctor before starting an exercise program. 
· Keep moving. Yaw Guerrero the lawn, work in the garden, or [employer]. Take the stairs instead of the elevator at work. · If you smoke, quit. People who smoke have an increased risk for heart attack, stroke, cancer, and other lung illnesses. Quitting is hard, but there are ways to boost your chance of quitting tobacco for good. ¨ Use nicotine gum, patches, or lozenges. ¨ Ask your doctor about stop-smoking programs and medicines. ¨ Keep trying. In addition to reducing your risk of diseases in the future, you will notice some benefits soon after you stop using tobacco. If you have shortness of breath or asthma symptoms, they will likely get better within a few weeks after you quit. · Limit how much alcohol you drink. Moderate amounts of alcohol (up to 2 drinks a day for men, 1 drink a day for women) are okay. But drinking too much can lead to liver problems, high blood pressure, and other health problems. Family health If you have a family, there are many things you can do together to improve your health. · Eat meals together as a family as often as possible. · Eat healthy foods. This includes fruits, vegetables, lean meats and dairy, and whole grains. · Include your family in your fitness plan. Most people think of activities such as jogging or tennis as the way to fitness, but there are many ways you and your family can be more active. Anything that makes you breathe hard and gets your heart pumping is exercise. Here are some tips: 
¨ Walk to do errands or to take your child to school or the bus. ¨ Go for a family bike ride after dinner instead of watching TV. Where can you learn more? Go to http://glenisDRB Systemsbrianna.info/. Enter Z439 in the search box to learn more about \"A Healthy Lifestyle: Care Instructions. \" Current as of: May 12, 2017 Content Version: 11.4 © 8486-6568 [employer]. Care instructions adapted under license by [employer] (which disclaims liability or warranty for this information). If you have questions about a medical condition or this instruction, always ask your healthcare professional. [address] any warranty or liability for your use of this information. Introducing [facility]! [facility] introduces Solar Flow-Through patient portal. Now you can access parts of your medical record, email your doctor's office, and request medication refills online. 1. In your internet browser, go to [email or web address] 2. Click on the First Time User? Click Here link in the Sign In box. You will see the New Member Sign Up page. 3. Enter your Solar Flow-Through Access Code exactly as it appears below. You will not need to use this code after youve completed the sign-up process. If you do not sign up before the expiration date, you must request a new code. · Solar Flow-Through Access Code: Belem Tang Expires: 5/14/2018  9:19 AM 
 
4.  Enter the last four digits of your Social Security Number (xxxx) and Date of Birth (mm/dd/yyyy) as indicated and click Submit. You will be taken to the next sign-up page. 5. Create a ZALORA ID. This will be your ZALORA login ID and cannot be changed, so think of one that is secure and easy to remember. 6. Create a ZALORA password. You can change your password at any time. 7. Enter your Password Reset Question and Answer. This can be used at a later time if you forget your password. 8. Enter your e-mail address. You will receive e-mail notification when new information is available in [address] 9. Click Sign Up. You can now view and download portions of your medical record. 10. Click the Download Summary menu link to download a portable copy of your medical information. If you have questions, please visit the Frequently Asked Questions section of the ZALORA website. Remember, ZALORA is NOT to be used for urgent needs. For medical emergencies, dial 911. Now available from your iPhone and Android! Please provide this summary of care documentation to your next provider. Your primary care clinician is listed as Shadia Danielle. If you have any questions after today's visit, please call [phone].

## 2018-02-13 NOTE — PROGRESS NOTES
Chief Complaint   Patient presents with    Other     polydipsia     Shaking     after not eating for long periods of time     1. Have you been to the ER, urgent care clinic since your last visit? Hospitalized since your last visit? No    2. Have you seen or consulted any other health care providers outside of the [address] [place] [address] since your last visit? Include any pap smears or colon screening.  No

## 2019-03-12 ENCOUNTER — OFFICE VISIT (OUTPATIENT)
Dept: FAMILY MEDICINE CLINIC | Age: 24
End: 2019-03-12

## 2019-03-12 VITALS
BODY MASS INDEX: 27.43 KG/M2 | TEMPERATURE: 97.5 F | WEIGHT: 181 LBS | HEIGHT: 68 IN | DIASTOLIC BLOOD PRESSURE: 62 MMHG | HEART RATE: 53 BPM | SYSTOLIC BLOOD PRESSURE: 100 MMHG | OXYGEN SATURATION: 97 % | RESPIRATION RATE: 16 BRPM

## 2019-03-12 DIAGNOSIS — M54.50 CHRONIC LOW BACK PAIN WITHOUT SCIATICA, UNSPECIFIED BACK PAIN LATERALITY: Primary | ICD-10-CM

## 2019-03-12 DIAGNOSIS — G89.29 CHRONIC LOW BACK PAIN WITHOUT SCIATICA, UNSPECIFIED BACK PAIN LATERALITY: Primary | ICD-10-CM

## 2019-03-12 RX ORDER — LIDOCAINE 50 MG/G
PATCH TOPICAL
Qty: 1 PACKAGE | Refills: 0 | Status: SHIPPED | OUTPATIENT
Start: 2019-03-12

## 2019-03-12 NOTE — PATIENT INSTRUCTIONS
Back Pain: Care Instructions  Your Care Instructions    Back pain has many possible causes. It is often related to problems with muscles and ligaments of the back. It may also be related to problems with the nerves, discs, or bones of the back. Moving, lifting, standing, sitting, or sleeping in an awkward way can strain the back. Sometimes you don't notice the injury until later. Arthritis is another common cause of back pain. Although it may hurt a lot, back pain usually improves on its own within several weeks. Most people recover in 12 weeks or less. Using good home treatment and being careful not to stress your back can help you feel better sooner. Follow-up care is a key part of your treatment and safety. Be sure to make and go to all appointments, and call your doctor if you are having problems. It's also a good idea to know your test results and keep a list of the medicines you take. How can you care for yourself at home? · Sit or lie in positions that are most comfortable and reduce your pain. Try one of these positions when you lie down:  ? Lie on your back with your knees bent and supported by large pillows. ? Lie on the floor with your legs on the seat of a sofa or chair. ? Lie on your side with your knees and hips bent and a pillow between your legs. ? Lie on your stomach if it does not make pain worse. · Do not sit up in bed, and avoid soft couches and twisted positions. Bed rest can help relieve pain at first, but it delays healing. Avoid bed rest after the first day of back pain. · Change positions every 30 minutes. If you must sit for long periods of time, take breaks from sitting. Get up and walk around, or lie in a comfortable position. · Try using a heating pad on a low or medium setting for 15 to 20 minutes every 2 or 3 hours. Try a warm shower in place of one session with the heating pad. · You can also try an ice pack for 10 to 15 minutes every 2 to 3 hours.  Put a thin cloth between the ice pack and your skin. · Take pain medicines exactly as directed. ? If the doctor gave you a prescription medicine for pain, take it as prescribed. ? If you are not taking a prescription pain medicine, ask your doctor if you can take an over-the-counter medicine. · Take short walks several times a day. You can start with 5 to 10 minutes, 3 or 4 times a day, and work up to longer walks. Walk on level surfaces and avoid hills and stairs until your back is better. · Return to work and other activities as soon as you can. Continued rest without activity is usually not good for your back. · To prevent future back pain, do exercises to stretch and strengthen your back and stomach. Learn how to use good posture, safe lifting techniques, and proper body mechanics. When should you call for help? Call your doctor now or seek immediate medical care if:    · You have new or worsening numbness in your legs.     · You have new or worsening weakness in your legs. (This could make it hard to stand up.)     · You lose control of your bladder or bowels.    Watch closely for changes in your health, and be sure to contact your doctor if:    · You have a fever, lose weight, or don't feel well.     · You do not get better as expected. Where can you learn more? Go to http://glenis-brianna.info/. Enter Y929 in the search box to learn more about \"Back Pain: Care Instructions. \"  Current as of: September 20, 2018  Content Version: 11.9  © 8184-6282 [employer]. Care instructions adapted under license by [email or web address] (which disclaims liability or warranty for this information). If you have questions about a medical condition or this instruction, always ask your healthcare professional. [place] [address] any warranty or liability for your use of this information.

## 2019-03-12 NOTE — PROGRESS NOTES
SUBJECTIVE  Chief Complaint   Patient presents with    Back Pain     chronic- with re-injury for about 1 year      Patient presents complaining of a more than 1 year history of low back pain. Starting noticing in high school. Location: lower lumbar across the low back  Quality: pressure, tight, constant  Injury: says it hurts after his long runs or if he lands on both of his feet at the same time  Exercise - cardio 4-5 days per week - running, basketball   Radiation: denies at this time but sometimes if he bends forward, he feels tingling in his toes on both feet  Has tried: no meds, supine stretches on back  Aggravating factors: running, high impact activities   Relieving factors: stretches  X-rays: not yet  The patient denies any numbness, tingling, or weakness radiating into the lower extremity. No loss of bowel or bladder control. OBJECTIVE    Blood pressure 100/62, pulse (!) 53, temperature 97.5 °F (36.4 °C), temperature source Oral, resp. rate 16, height 5' 8\" (1.727 m), weight 181 lb (82.1 kg), SpO2 97 %. General:  alert, cooperative, well appearing, in no apparent distress. Back:  Inspection of the back demonstrates there is no obvious deformity or misalignment. Mild left parathoracic spasms present. There is mild bony tenderness along the midline lumbar vertebrae at L1-L3. There is no sacroiliac joint tenderness. There is paraspinal muscle tenderness in the thoracic and lumbar. Range of motion testing demonstrates there is normal flexion, extension, side bending and rotation of the back. There is a negative straight leg raise. There is normal internal and external rotation of the hip. Hips:  There is no tenderness of the trochanteric bursa. There is mild tenderness of the bilateral piriformis. Neuro: The patients gait is normal. Strength is 5/5 in the lower extremity bilaterally. No gross sensory deficits. Skin:  No local rashes. No erythema. No warmth.     Psych: normal affect. Mood good. Oriented x 3. Judgement and insight intact. ASSESSMENT / PLAN    ICD-10-CM ICD-9-CM    1. Chronic low back pain without sciatica, unspecified back pain laterality M54.5 724.2 XR SPINE LUMB 2 OR 3 V    G89.29 338.29      X-rays of low back today. Can use tylenol for pain as needed. Can advance to NSAIDs sparingly when flaring up. Take with food. Advised on potentially trying lidoderm if covered by insurance. Home exercise program advised - stretches after activity, heat and stretches at bedtime. Limit high impact activities. Work on core strength. Consider formal PT if these interventions are not helpful. All chart history elements were reviewed by me at the time of the visit even though marked at time of note closure. Patient understands our medical plan. Patient has provided input and agrees with goals. Alternatives have been explained and offered. All questions answered. The patient is to call if condition worsens or fails to improve. As needed.

## 2019-03-12 NOTE — PROGRESS NOTES
Chief Complaint   Patient presents with    Back Pain     chronic- with re-injury for about 1 year     1. Have you been to the ER, urgent care clinic since your last visit? Hospitalized since your last visit? No    2. Have you seen or consulted any other health care providers outside of the [address] [place] [address] since your last visit? Include any pap smears or colon screening.  [facility]- for acne

## 2019-03-13 ENCOUNTER — HOSPITAL ENCOUNTER (OUTPATIENT)
Dept: GENERAL RADIOLOGY | Age: 24
Discharge: HOME | End: 2019-03-13
Payer: MEDICAID

## 2019-03-13 DIAGNOSIS — G89.29 CHRONIC LOW BACK PAIN WITHOUT SCIATICA, UNSPECIFIED BACK PAIN LATERALITY: ICD-10-CM

## 2019-03-13 DIAGNOSIS — M54.50 CHRONIC LOW BACK PAIN WITHOUT SCIATICA, UNSPECIFIED BACK PAIN LATERALITY: ICD-10-CM

## 2019-03-13 PROCEDURE — 72100 X-RAY EXAM L-S SPINE 2/3 VWS: CPT

## 2019-03-16 NOTE — PROGRESS NOTES
Patient identifiers verified. Patient advised that x-rays were normal.  No signs of early arthritis or other abnormalities. He voices understanding.

## 2019-07-03 ENCOUNTER — TELEPHONE (OUTPATIENT)
Dept: FAMILY MEDICINE CLINIC | Age: 24
End: 2019-07-03

## 2019-07-03 NOTE — TELEPHONE ENCOUNTER
Pt called in requesting a letter stating that he tested positive for latent TB. He also would like the letter to state that he has taken the proper treatment. Pt can be reached at [phone].

## 2019-07-03 NOTE — LETTER
7/3/2019 4:05 PM    Mr. Chip Hodgkins [address] [place] [address]      To Whom It May Concern,        Mr. Ernestine Win has had treatment for latent TB with [facility] for 7 months in 2013. Current recommendations are for treatment with [facility] for 6-9 months. At this time he is adequately treated. He currently is not having any signs or symptoms consistent with TB.           Sincerely,      Monika Boudreaux MD

## 2022-03-19 PROBLEM — R76.11 PPD POSITIVE, TREATED: Status: ACTIVE | Noted: 2017-02-22

## 2023-08-29 ENCOUNTER — OFFICE VISIT (OUTPATIENT)
Age: 28
End: 2023-08-29
Payer: COMMERCIAL

## 2023-08-29 VITALS
BODY MASS INDEX: 31.52 KG/M2 | TEMPERATURE: 98.6 F | SYSTOLIC BLOOD PRESSURE: 118 MMHG | HEIGHT: 68 IN | DIASTOLIC BLOOD PRESSURE: 74 MMHG | HEART RATE: 78 BPM | RESPIRATION RATE: 16 BRPM | OXYGEN SATURATION: 97 % | WEIGHT: 208 LBS

## 2023-08-29 DIAGNOSIS — Z00.00 WELL ADULT EXAM: Primary | ICD-10-CM

## 2023-08-29 DIAGNOSIS — E66.09 OBESITY DUE TO EXCESS CALORIES WITHOUT SERIOUS COMORBIDITY, UNSPECIFIED CLASSIFICATION: ICD-10-CM

## 2023-08-29 DIAGNOSIS — Z13.220 SCREENING CHOLESTEROL LEVEL: ICD-10-CM

## 2023-08-29 DIAGNOSIS — G89.29 CHRONIC LOW BACK PAIN WITHOUT SCIATICA, UNSPECIFIED BACK PAIN LATERALITY: ICD-10-CM

## 2023-08-29 DIAGNOSIS — Z83.3 FAMILY HISTORY OF DIABETES MELLITUS: ICD-10-CM

## 2023-08-29 DIAGNOSIS — Z13.1 SCREENING FOR DIABETES MELLITUS: ICD-10-CM

## 2023-08-29 DIAGNOSIS — M54.50 CHRONIC LOW BACK PAIN WITHOUT SCIATICA, UNSPECIFIED BACK PAIN LATERALITY: ICD-10-CM

## 2023-08-29 PROCEDURE — 99385 PREV VISIT NEW AGE 18-39: CPT | Performed by: FAMILY MEDICINE

## 2023-08-29 RX ORDER — METAXALONE 800 MG/1
800 TABLET ORAL 3 TIMES DAILY
Qty: 30 TABLET | Refills: 0 | Status: SHIPPED | OUTPATIENT
Start: 2023-08-29 | End: 2023-09-08

## 2023-08-29 SDOH — ECONOMIC STABILITY: FOOD INSECURITY: WITHIN THE PAST 12 MONTHS, THE FOOD YOU BOUGHT JUST DIDN'T LAST AND YOU DIDN'T HAVE MONEY TO GET MORE.: NEVER TRUE

## 2023-08-29 SDOH — ECONOMIC STABILITY: INCOME INSECURITY: HOW HARD IS IT FOR YOU TO PAY FOR THE VERY BASICS LIKE FOOD, HOUSING, MEDICAL CARE, AND HEATING?: NOT VERY HARD

## 2023-08-29 SDOH — ECONOMIC STABILITY: HOUSING INSECURITY
IN THE LAST 12 MONTHS, WAS THERE A TIME WHEN YOU DID NOT HAVE A STEADY PLACE TO SLEEP OR SLEPT IN A SHELTER (INCLUDING NOW)?: NO

## 2023-08-29 SDOH — ECONOMIC STABILITY: FOOD INSECURITY: WITHIN THE PAST 12 MONTHS, YOU WORRIED THAT YOUR FOOD WOULD RUN OUT BEFORE YOU GOT MONEY TO BUY MORE.: NEVER TRUE

## 2023-08-29 ASSESSMENT — PATIENT HEALTH QUESTIONNAIRE - PHQ9
1. LITTLE INTEREST OR PLEASURE IN DOING THINGS: 0
9. THOUGHTS THAT YOU WOULD BE BETTER OFF DEAD, OR OF HURTING YOURSELF: 0
SUM OF ALL RESPONSES TO PHQ QUESTIONS 1-9: 0
10. IF YOU CHECKED OFF ANY PROBLEMS, HOW DIFFICULT HAVE THESE PROBLEMS MADE IT FOR YOU TO DO YOUR WORK, TAKE CARE OF THINGS AT HOME, OR GET ALONG WITH OTHER PEOPLE: 0
SUM OF ALL RESPONSES TO PHQ QUESTIONS 1-9: 0
SUM OF ALL RESPONSES TO PHQ QUESTIONS 1-9: 0
5. POOR APPETITE OR OVEREATING: 0
4. FEELING TIRED OR HAVING LITTLE ENERGY: 0
6. FEELING BAD ABOUT YOURSELF - OR THAT YOU ARE A FAILURE OR HAVE LET YOURSELF OR YOUR FAMILY DOWN: 0
SUM OF ALL RESPONSES TO PHQ9 QUESTIONS 1 & 2: 0
7. TROUBLE CONCENTRATING ON THINGS, SUCH AS READING THE NEWSPAPER OR WATCHING TELEVISION: 0
SUM OF ALL RESPONSES TO PHQ QUESTIONS 1-9: 0
2. FEELING DOWN, DEPRESSED OR HOPELESS: 0
8. MOVING OR SPEAKING SO SLOWLY THAT OTHER PEOPLE COULD HAVE NOTICED. OR THE OPPOSITE, BEING SO FIGETY OR RESTLESS THAT YOU HAVE BEEN MOVING AROUND A LOT MORE THAN USUAL: 0
3. TROUBLE FALLING OR STAYING ASLEEP: 0

## 2025-04-08 ENCOUNTER — TELEPHONE (OUTPATIENT)
Facility: CLINIC | Age: 30
End: 2025-04-08

## 2025-04-09 NOTE — TELEPHONE ENCOUNTER
Spoke with pt and advise that he needed a cpe before Dr Ocampo could order a chest XR since he had not been seen for 2 yrs. Offered 4/16/2025 but he states that it was too far out and he would go to pt first, closing encounter.

## 2025-04-09 NOTE — TELEPHONE ENCOUNTER
Number on file is requesting that we enter a mailbox number. Unable to speech with Christian Shafer. Patient is due for a physical.

## 2025-05-08 ENCOUNTER — TELEPHONE (OUTPATIENT)
Facility: CLINIC | Age: 30
End: 2025-05-08

## 2025-05-08 NOTE — TELEPHONE ENCOUNTER
Upon chart review of the old system, we have written a letter on 07/03/20219 stating Christian Shafer was treated for latent TB with [facility] for 7 months back in 2013. Christian is scheduled for his physical on June 03,2025. Please advise.

## 2025-05-08 NOTE — TELEPHONE ENCOUNTER
Spoke with Christian Shafer to advise that we have re-printed his letter from 2019, Dr. Ocampo has sign with the date of 05/08/2025 and to please keep his June 03,2025 physical appointment. Christian corona.

## 2025-05-08 NOTE — TELEPHONE ENCOUNTER
Advise the patient that the nurse Carlie & Dr Ocampo are in patient care and to be patient for a return call. Also patient scheduled a physical appointment in June. Please advise.

## 2025-05-08 NOTE — TELEPHONE ENCOUNTER
Received a call from Mr. 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 Please advise